# Patient Record
Sex: MALE | Race: WHITE | NOT HISPANIC OR LATINO | Employment: FULL TIME | URBAN - METROPOLITAN AREA
[De-identification: names, ages, dates, MRNs, and addresses within clinical notes are randomized per-mention and may not be internally consistent; named-entity substitution may affect disease eponyms.]

---

## 2017-10-11 ENCOUNTER — HISTORICAL (OUTPATIENT)
Dept: SURGERY | Facility: HOSPITAL | Age: 37
End: 2017-10-11

## 2022-04-30 NOTE — OP NOTE
Patient:   Lejeune, Gaflin             MRN: [id number]            FIN: [id number]               Age:   37 years     Sex:  Male     :  1980   Associated Diagnoses:   Cholecystitis, chronic   Author:   Donte García MD      Operative Note   Operative Information   Date/ Time:  10/11/2017 22:22:00.     Procedures Performed: Procedure Code   Cholecystectomy Laparoscopic (None) on 10/11/2017 at 37 Years.  Comments:  10/11/2017 14:47 - Kraig ADAIR, Rima RAVI  auto-populated from documented surgical case.     Indications: chronic cholecystitis.     Preoperative Diagnosis: Cholecystitis, chronic ([id number] K81.1).     Postoperative Diagnosis: Cholecystitis, chronic ([id number] K81.1).     Surgeon: Donte García MD.     Assistant: Sara Nicole     Anesthesia: Gen..     Speciman Removed: gallbladder.     Description of Procedure/Findings/    Complications:     The patient was taken to the operating room. Patient was placed under general anesthesia. Abdomen was prepped and draped in the usual sterile fashion. An appropriate timeout was performed. Optical tipped trocar was used to access the peritoneal cavity. Pneumoperitoneum was instituted. Abdominal exploration was negative. Gallbladder was noted and held in a cephalad direction. The infundibulum was noted and held in a lateral direction. The peritoneum overlying the infundibulum was dissected revealing the cystic duct gallbladder junction and the cystic artery. Critical view was obtained. The cystic duct and cystic artery were clipped and transected. The gallbladder was removed from the undersurface of the liver with sharp and electric dissection. Hemostasis was assured. The clips were in excellent position and there was no bleeding or leakage of bile. Gallbladder was completely removed from the liver hemostasis was assured. The gallbladder was removed from the abdomen. Once again hemostasis was assured the operative site was irrigated until clear.  Trochars were removed and pneumoperitoneum released the incisions were closed with Vicryl..     Esimated blood loss: loss less than  15  cc.     Complications: None.